# Patient Record
Sex: MALE | Race: WHITE | NOT HISPANIC OR LATINO | URBAN - METROPOLITAN AREA
[De-identification: names, ages, dates, MRNs, and addresses within clinical notes are randomized per-mention and may not be internally consistent; named-entity substitution may affect disease eponyms.]

---

## 2017-02-11 ENCOUNTER — COMMUNICATION - HEALTHEAST (OUTPATIENT)
Dept: INTERNAL MEDICINE | Facility: CLINIC | Age: 37
End: 2017-02-11

## 2017-02-11 DIAGNOSIS — I10 UNSPECIFIED ESSENTIAL HYPERTENSION: ICD-10-CM

## 2017-05-17 ENCOUNTER — COMMUNICATION - HEALTHEAST (OUTPATIENT)
Dept: INTERNAL MEDICINE | Facility: CLINIC | Age: 37
End: 2017-05-17

## 2017-05-17 DIAGNOSIS — I10 UNSPECIFIED ESSENTIAL HYPERTENSION: ICD-10-CM

## 2018-06-13 ENCOUNTER — OFFICE VISIT - HEALTHEAST (OUTPATIENT)
Dept: INTERNAL MEDICINE | Facility: CLINIC | Age: 38
End: 2018-06-13

## 2018-06-13 DIAGNOSIS — Z00.00 HEALTH MAINTENANCE EXAMINATION: ICD-10-CM

## 2018-06-13 DIAGNOSIS — I10 ESSENTIAL HYPERTENSION: ICD-10-CM

## 2018-06-13 LAB
ANION GAP SERPL CALCULATED.3IONS-SCNC: 7 MMOL/L (ref 5–18)
BUN SERPL-MCNC: 14 MG/DL (ref 8–22)
CALCIUM SERPL-MCNC: 9.2 MG/DL (ref 8.5–10.5)
CHLORIDE BLD-SCNC: 107 MMOL/L (ref 98–107)
CHOLEST SERPL-MCNC: 165 MG/DL
CO2 SERPL-SCNC: 26 MMOL/L (ref 22–31)
CREAT SERPL-MCNC: 0.83 MG/DL (ref 0.7–1.3)
FASTING STATUS PATIENT QL REPORTED: YES
GFR SERPL CREATININE-BSD FRML MDRD: >60 ML/MIN/1.73M2
GLUCOSE BLD-MCNC: 109 MG/DL (ref 70–125)
HDLC SERPL-MCNC: 35 MG/DL
LDLC SERPL CALC-MCNC: 114 MG/DL
POTASSIUM BLD-SCNC: 4.4 MMOL/L (ref 3.5–5)
SODIUM SERPL-SCNC: 140 MMOL/L (ref 136–145)
TRIGL SERPL-MCNC: 81 MG/DL

## 2018-06-13 ASSESSMENT — MIFFLIN-ST. JEOR: SCORE: 1928.65

## 2018-06-14 ENCOUNTER — COMMUNICATION - HEALTHEAST (OUTPATIENT)
Dept: INTERNAL MEDICINE | Facility: CLINIC | Age: 38
End: 2018-06-14

## 2019-06-30 ENCOUNTER — COMMUNICATION - HEALTHEAST (OUTPATIENT)
Dept: INTERNAL MEDICINE | Facility: CLINIC | Age: 39
End: 2019-06-30

## 2019-06-30 DIAGNOSIS — I10 ESSENTIAL HYPERTENSION: ICD-10-CM

## 2019-07-01 RX ORDER — LOSARTAN POTASSIUM 50 MG/1
TABLET ORAL
Qty: 30 TABLET | Refills: 4 | Status: SHIPPED | OUTPATIENT
Start: 2019-07-01

## 2021-05-30 NOTE — TELEPHONE ENCOUNTER
RN cannot approve Refill Request    RN can NOT refill this medication Protocol failed and NO refill given.       Mariak Forde, [facility] Triage/Med Refill 7/1/2019    Requested Prescriptions   Pending Prescriptions Disp Refills     losartan (COZAAR) 50 MG tablet [Pharmacy Med Name: Losartan Potassium Oral Tablet 50 MG] 30 tablet 4     Sig: TAKE ONE TABLET BY MOUTH ONE TIME DAILY       Angiotensin Receptor Blocker Protocol Failed - 6/30/2019  9:54 AM        Failed - PCP or prescribing provider visit in past 12 months       Last office visit with prescriber/PCP: 6/13/2018 Torito Banks DO OR same dept: Visit date not found OR same specialty: 6/13/2018 Torito Banks DO  Last physical: Visit date not found Last MTM visit: Visit date not found   Next visit within 3 mo: Visit date not found  Next physical within 3 mo: Visit date not found  Prescriber OR PCP: Torito Banks DO  Last diagnosis associated with med order: 1. Essential hypertension  - losartan (COZAAR) 50 MG tablet [Pharmacy Med Name: Losartan Potassium Oral Tablet 50 MG]; TAKE ONE TABLET BY MOUTH ONE TIME DAILY   Dispense: 30 tablet; Refill: 4    If protocol passes may refill for 12 months if within 3 months of last provider visit (or a total of 15 months).             Failed - Serum potassium within the past 12 months     No results found for: LN-POTASSIUM          Failed - Blood pressure filed in past 12 months     BP Readings from Last 1 Encounters:   06/13/18 120/80             Failed - Serum creatinine within the past 12 months     Creatinine   Date Value Ref Range Status   06/13/2018 0.83 0.70 - 1.30 mg/dL Final

## 2021-06-01 VITALS — HEIGHT: 71 IN | WEIGHT: 223 LBS | BODY MASS INDEX: 31.22 KG/M2

## 2021-06-18 NOTE — PROGRESS NOTES
[facility] Clinic Note    Susana Aguilera   38 y.o. male    Date of Visit: 6/13/2018  Chief Complaint   Patient presents with     Medication Refill     LOSARTAN     Establish Care       ASSESSMENT/PLAN  1. Health maintenance examination  Lipid Cascade    Basic Metabolic Panel   2. Essential hypertension  Basic Metabolic Panel    losartan (COZAAR) 50 MG tablet     ---------------------------------------------    1.  Check fasting labs.  We talked about his health, emphasized exercise, weight loss.    2.  Blood pressure well controlled, refill losartan    Return for Annual physical.      SUBJECTIVE  Susana Aguilera is a healthy 38-year-old man who presents to establish care.  He previously saw Dr. Ismael Smalls.  He forgot to do the annual check in the last year.    He gets exercise regularly, tends to run 15-20 minutes every morning, do pull-ups.  He works at the [employer] in the [employer] [place].  He takes losartan for high blood pressure, but does not have any other medical problems.  There is a family history of diabetes as well as cancer, the latter in his maternal aunts and uncles.    He has family back in [place].  He is currently fasting for the month of Ramadan.      Medications, allergies, and problem list were reviewed and updated    Patient Active Problem List   Diagnosis     Essential Hypertension     Past Medical History:   Diagnosis Date     Anxiety     Created by Conversion  Replacement Utility updated for latest IMO load     Anxiety about health      Chest pain      Helicobacter pylori (H. pylori) infection      Palpitations     Created by Conversion      Current Outpatient Prescriptions   Medication Sig Dispense Refill     losartan (COZAAR) 50 MG tablet Take 1 tablet (50 mg total) by mouth daily. 30 tablet 11     No current facility-administered medications for this visit.      No Known Allergies    EXAM  Vitals:    06/13/18 1123   BP: 120/80   Patient Site: Left Arm   Pulse: 64   Resp: 12  "  Weight: (!) 223 lb (101.2 kg)   Height: 5' 11\" (1.803 m)     General: alert, no distress  HEENT: sclerae anicteric, moist oral mucosa  Heart: Regular rate and rhythm, no murmurs.  No pretibial edema.    Lungs: Clear to auscultation bilat  Gastrointestinal: abdomen is non-distended.    Skin: warm/dry, no rashes  Neuro: no gross abnormalities  Psychiatric: Pleasant affect      Results reviewed: Labs checked, reviewed last clinic visit from Dr. Ismael Smalls.  They talked about anxiety.  This is no longer concern of this patient.    Data points: 3    Torito Banks, DO  Internal Medicine  [facility]    "